# Patient Record
Sex: MALE | Race: WHITE | NOT HISPANIC OR LATINO | Employment: FULL TIME | URBAN - METROPOLITAN AREA
[De-identification: names, ages, dates, MRNs, and addresses within clinical notes are randomized per-mention and may not be internally consistent; named-entity substitution may affect disease eponyms.]

---

## 2024-08-01 ENCOUNTER — OFFICE VISIT (OUTPATIENT)
Dept: URGENT CARE | Facility: PHYSICIAN GROUP | Age: 29
End: 2024-08-01
Payer: COMMERCIAL

## 2024-08-01 VITALS
RESPIRATION RATE: 16 BRPM | OXYGEN SATURATION: 97 % | WEIGHT: 192 LBS | TEMPERATURE: 98.5 F | HEIGHT: 70 IN | SYSTOLIC BLOOD PRESSURE: 118 MMHG | HEART RATE: 85 BPM | BODY MASS INDEX: 27.49 KG/M2 | DIASTOLIC BLOOD PRESSURE: 72 MMHG

## 2024-08-01 DIAGNOSIS — R68.83 CHILLS: ICD-10-CM

## 2024-08-01 DIAGNOSIS — R11.0 NAUSEA: Primary | ICD-10-CM

## 2024-08-01 DIAGNOSIS — R19.7 DIARRHEA, UNSPECIFIED TYPE: ICD-10-CM

## 2024-08-01 DIAGNOSIS — R52 GENERALIZED BODY ACHES: ICD-10-CM

## 2024-08-01 PROCEDURE — 1126F AMNT PAIN NOTED NONE PRSNT: CPT | Performed by: PHYSICIAN ASSISTANT

## 2024-08-01 PROCEDURE — 3078F DIAST BP <80 MM HG: CPT | Performed by: PHYSICIAN ASSISTANT

## 2024-08-01 PROCEDURE — 3074F SYST BP LT 130 MM HG: CPT | Performed by: PHYSICIAN ASSISTANT

## 2024-08-01 PROCEDURE — 99203 OFFICE O/P NEW LOW 30 MIN: CPT | Performed by: PHYSICIAN ASSISTANT

## 2024-08-01 RX ORDER — ONDANSETRON 4 MG/1
4 TABLET, ORALLY DISINTEGRATING ORAL EVERY 6 HOURS PRN
Qty: 15 TABLET | Refills: 0 | Status: SHIPPED | OUTPATIENT
Start: 2024-08-01

## 2024-08-01 ASSESSMENT — PAIN SCALES - GENERAL: PAINLEVEL: NO PAIN

## 2024-08-01 NOTE — PROGRESS NOTES
"Subjective:   Cody Alan Fahringer is a 29 y.o. male who presents for Emesis, Diarrhea, and Chills (Hot and cold /Body aches )      HPI  The patient presents to the Urgent Care with complaints of flulike symptoms onset 3 days ago.  Symptoms started with hide follow-up followed by body aches, nausea, chills, and some diarrhea.  Reports history of fevers.  Mild dry cough.  Some shortness of breath.  Patient missed a few days of work this week and requesting a work note for excuse from work.   Denies any significant nasal congestion, chest pain, abdominal pain, vomiting, bloody or black stools. Tolerating fluids well. Decreased appetite. No known sick contacts.        History reviewed. No pertinent past medical history.  Allergies   Allergen Reactions    Penicillins Anaphylaxis        Objective:     /72 (BP Location: Right arm, Patient Position: Sitting, BP Cuff Size: Large adult)   Pulse 85   Temp 36.9 °C (98.5 °F) (Temporal)   Resp 16   Ht 1.778 m (5' 10\")   Wt 87.1 kg (192 lb)   SpO2 97%   BMI 27.55 kg/m²     Physical Exam  Vitals reviewed.   Constitutional:       General: He is not in acute distress.     Appearance: Normal appearance. He is not ill-appearing or toxic-appearing.   HENT:      Mouth/Throat:      Mouth: Mucous membranes are moist.      Pharynx: Oropharynx is clear.   Eyes:      Conjunctiva/sclera: Conjunctivae normal.   Cardiovascular:      Rate and Rhythm: Normal rate and regular rhythm.      Heart sounds: Normal heart sounds.   Pulmonary:      Effort: Pulmonary effort is normal. No respiratory distress.      Breath sounds: Normal breath sounds. No wheezing, rhonchi or rales.   Abdominal:      General: Abdomen is flat. Bowel sounds are normal. There is no distension.      Palpations: Abdomen is soft. There is no mass.      Tenderness: There is no abdominal tenderness. There is no guarding or rebound.   Musculoskeletal:      Cervical back: Neck supple. No rigidity.   Lymphadenopathy:      " Cervical: No cervical adenopathy.   Skin:     General: Skin is warm and dry.   Neurological:      General: No focal deficit present.      Mental Status: He is alert and oriented to person, place, and time.   Psychiatric:         Mood and Affect: Mood normal.         Behavior: Behavior normal.         Diagnosis and associated orders:     1. Nausea  - ondansetron (ZOFRAN ODT) 4 MG TABLET DISPERSIBLE; Take 1 Tablet by mouth every 6 hours as needed for Nausea/Vomiting.  Dispense: 15 Tablet; Refill: 0    2. Generalized body aches    3. Chills    4. Diarrhea, unspecified type       Comments/MDM:     The patient's presenting symptoms and exam findings are consistent with a viral gastroenteritis.  Patient overall is well-appearing in no acute distress.  Normal vital signs.  Tolerating fluids well.  I was unable to elicit any abdominal tenderness on exam.  Recommend increase fluid intake such as sips of fluids and Pedialyte, bland or BRAT diet and increase as tolerated. Avoid dairy of fatty foods for now. If no improvement in 5 to 7 days or any worsening, recommend returning to the urgent care or following up with PCP for re-evaluation.  Patient understands to present immediately to the ER if any severe abdominal pain, unable to tolerate fluids, or any other concerns.  Work note provided to excuse from work.        I personally reviewed prior external notes and test results pertinent to today's visit. Pathogenesis of diagnosis discussed including typical length and natural progression. Supportive care, natural history, differential diagnoses, and indications for immediate follow-up discussed. Patient expresses understanding and agrees to plan. Patient denies any other questions or concerns.     Follow-up with the primary care physician for recheck, reevaluation, and consideration of further management.    Please note that this dictation was created using voice recognition software. I have made a reasonable attempt to correct  obvious errors, but I expect that there are errors of grammar and possibly content that I did not discover before finalizing the note.    This note was electronically signed by Carmine Adrian PA-C

## 2024-08-01 NOTE — LETTER
August 1, 2024         Patient: Cody Alan Fahringer   YOB: 1995   Date of Visit: 8/1/2024           To Whom it May Concern:    Cody Fahringer was seen in my clinic on 8/1/2024. Please excuse from work from 7/30/24 through 8/2/24.     If you have any questions or concerns, please don't hesitate to call.        Sincerely,           Carmine Adrian P.A.-C.  Electronically Signed